# Patient Record
Sex: MALE | Race: AMERICAN INDIAN OR ALASKA NATIVE | NOT HISPANIC OR LATINO | URBAN - METROPOLITAN AREA
[De-identification: names, ages, dates, MRNs, and addresses within clinical notes are randomized per-mention and may not be internally consistent; named-entity substitution may affect disease eponyms.]

---

## 2020-07-25 ENCOUNTER — TELEPHONE ENCOUNTER (OUTPATIENT)
Dept: URBAN - METROPOLITAN AREA CLINIC 13 | Facility: CLINIC | Age: 65
End: 2020-07-25

## 2020-07-26 ENCOUNTER — TELEPHONE ENCOUNTER (OUTPATIENT)
Dept: URBAN - METROPOLITAN AREA CLINIC 13 | Facility: CLINIC | Age: 65
End: 2020-07-26

## 2020-07-26 RX ORDER — AMOXICILLIN 500 MG/1
CAPSULE ORAL
Qty: 21 | Refills: 0 | Status: ACTIVE | COMMUNITY
Start: 2018-05-19

## 2020-07-26 RX ORDER — MULTIVITAMIN
TAKE 1 TABLET DAILY TABLET ORAL
Refills: 0 | Status: ACTIVE | COMMUNITY

## 2020-07-26 RX ORDER — LATANOPROST/PF 0.005 %
INSTILL 1 DROP IN BOTH EYES AT BEDTIME DROPS OPHTHALMIC (EYE)
Refills: 0 | Status: ACTIVE | COMMUNITY
Start: 2018-11-20

## 2020-07-26 RX ORDER — SODIUM SULFATE, POTASSIUM SULFATE, MAGNESIUM SULFATE 17.5; 3.13; 1.6 G/ML; G/ML; G/ML
5PM THE DAY BEFORE PROCEDURE, DRINK 1/2 OF PREP, THEN 6HOURS BEFORE PROCEDURE DRINK REMAINDER OF PREP SOLUTION, CONCENTRATE ORAL
Qty: 1 | Refills: 0 | Status: ACTIVE | COMMUNITY
Start: 2018-11-29

## 2020-07-26 RX ORDER — TESTOSTERONE 10 MG/.5G
APPLY 4 PUMP ACTUATIONS ONE TIME DAILY IN THE MORNING. APPLY TO CLEAN,DRY INTACT SKIN OF THIGH.  DO NOT APPLY TO GENITALS OR OTHER BODY PART GEL, METERED TOPICAL
Refills: 0 | Status: ACTIVE | COMMUNITY
Start: 2018-05-07

## 2020-07-26 RX ORDER — ATORVASTATIN CALCIUM 40 MG/1
TAKE 1 TABLET AT BEDTIME TABLET, FILM COATED ORAL
Refills: 0 | Status: ACTIVE | COMMUNITY
Start: 2018-09-04

## 2020-07-26 RX ORDER — AZELASTINE HYDROCHLORIDE 137 UG/1
INSTILL 2 SPRAYS IN EACH NOSTRIL ONCE DAILY SPRAY, METERED NASAL
Refills: 0 | Status: ACTIVE | COMMUNITY
Start: 2018-05-06

## 2020-07-26 RX ORDER — TRAMADOL HYDROCHLORIDE 50 MG/1
TABLET ORAL
Qty: 15 | Refills: 0 | Status: ACTIVE | COMMUNITY
Start: 2018-05-19

## 2020-07-26 RX ORDER — BUDESONIDE 0.25 MG/2ML
USE 1 UNIT DOSE VIA NEBULIZER DAILY INHALANT ORAL
Refills: 0 | Status: ACTIVE | COMMUNITY

## 2021-08-18 ENCOUNTER — TELEPHONE ENCOUNTER (OUTPATIENT)
Dept: URBAN - METROPOLITAN AREA CLINIC 113 | Facility: CLINIC | Age: 66
End: 2021-08-18

## 2021-08-18 VITALS — WEIGHT: 230 LBS | BODY MASS INDEX: 29.52 KG/M2 | HEIGHT: 74 IN

## 2021-08-18 RX ORDER — TRAMADOL HYDROCHLORIDE 50 MG/1
TABLET ORAL
Qty: 15 | Refills: 0 | Status: ON HOLD | COMMUNITY
Start: 2018-05-19

## 2021-08-18 RX ORDER — AMOXICILLIN 500 MG/1
CAPSULE ORAL
Qty: 21 | Refills: 0 | Status: ON HOLD | COMMUNITY
Start: 2018-05-19

## 2021-08-18 RX ORDER — ATORVASTATIN CALCIUM 40 MG/1
TAKE 1 TABLET AT BEDTIME TABLET, FILM COATED ORAL
Refills: 0 | Status: ACTIVE | COMMUNITY
Start: 2018-09-04

## 2021-08-18 RX ORDER — LATANOPROST/PF 0.005 %
INSTILL 1 DROP IN BOTH EYES AT BEDTIME DROPS OPHTHALMIC (EYE)
Refills: 0 | Status: ACTIVE | COMMUNITY
Start: 2018-11-20

## 2021-08-18 RX ORDER — MULTIVITAMIN
TAKE 1 TABLET DAILY TABLET ORAL
Refills: 0 | Status: ACTIVE | COMMUNITY

## 2021-08-18 RX ORDER — EZETIMIBE 10 MG/1
1 TABLET TABLET ORAL ONCE A DAY
Status: ACTIVE | COMMUNITY

## 2021-08-18 RX ORDER — BUDESONIDE 0.25 MG/2ML
USE 1 UNIT DOSE VIA NEBULIZER DAILY INHALANT ORAL
Refills: 0 | Status: ON HOLD | COMMUNITY

## 2021-08-18 RX ORDER — AZELASTINE HYDROCHLORIDE 137 UG/1
INSTILL 2 SPRAYS IN EACH NOSTRIL ONCE DAILY SPRAY, METERED NASAL
Refills: 0 | Status: ON HOLD | COMMUNITY
Start: 2018-05-06

## 2021-08-18 RX ORDER — SODIUM SULFATE, POTASSIUM SULFATE, MAGNESIUM SULFATE 17.5; 3.13; 1.6 G/ML; G/ML; G/ML
5PM THE DAY BEFORE PROCEDURE, DRINK 1/2 OF PREP, THEN 6HOURS BEFORE PROCEDURE DRINK REMAINDER OF PREP SOLUTION, CONCENTRATE ORAL
Qty: 1 | Refills: 0 | Status: ON HOLD | COMMUNITY
Start: 2018-11-29

## 2021-08-18 RX ORDER — SODIUM, POTASSIUM,MAG SULFATES 17.5-3.13G
354 ML SOLUTION, RECONSTITUTED, ORAL ORAL
Qty: 354 MILLILITER | Refills: 0 | OUTPATIENT
Start: 2021-08-18 | End: 2021-08-19

## 2021-08-18 RX ORDER — TESTOSTERONE 10 MG/.5G
APPLY 4 PUMP ACTUATIONS ONE TIME DAILY IN THE MORNING. APPLY TO CLEAN,DRY INTACT SKIN OF THIGH.  DO NOT APPLY TO GENITALS OR OTHER BODY PART GEL, METERED TOPICAL
Refills: 0 | Status: ACTIVE | COMMUNITY
Start: 2018-05-07

## 2021-08-19 ENCOUNTER — LAB OUTSIDE AN ENCOUNTER (OUTPATIENT)
Dept: URBAN - METROPOLITAN AREA CLINIC 113 | Facility: CLINIC | Age: 66
End: 2021-08-19

## 2021-11-19 PROBLEM — 428283002 HISTORY OF POLYP OF COLON: Status: ACTIVE | Noted: 2021-08-19

## 2021-12-08 ENCOUNTER — TELEPHONE ENCOUNTER (OUTPATIENT)
Dept: URBAN - METROPOLITAN AREA CLINIC 113 | Facility: CLINIC | Age: 66
End: 2021-12-08

## 2021-12-09 ENCOUNTER — TELEPHONE ENCOUNTER (OUTPATIENT)
Dept: URBAN - METROPOLITAN AREA CLINIC 113 | Facility: CLINIC | Age: 66
End: 2021-12-09

## 2021-12-10 ENCOUNTER — OFFICE VISIT (OUTPATIENT)
Dept: URBAN - METROPOLITAN AREA MEDICAL CENTER 40 | Facility: MEDICAL CENTER | Age: 66
End: 2021-12-10
Payer: COMMERCIAL

## 2021-12-10 DIAGNOSIS — K57.30 ACQUIRED DIVERTICULOSIS OF COLON: ICD-10-CM

## 2021-12-10 DIAGNOSIS — Z80.0 FAMILY HISTORY OF ANAL CANCER: ICD-10-CM

## 2021-12-10 DIAGNOSIS — K63.5 BENIGN COLONIC POLYP: ICD-10-CM

## 2021-12-10 DIAGNOSIS — Z12.11 COLON CANCER SCREENING: ICD-10-CM

## 2021-12-10 DIAGNOSIS — Z86.010 ADENOMAS PERSONAL HISTORY OF COLONIC POLYPS: ICD-10-CM

## 2021-12-10 PROCEDURE — 45380 COLONOSCOPY AND BIOPSY: CPT | Performed by: INTERNAL MEDICINE

## 2021-12-28 ENCOUNTER — OFFICE VISIT (OUTPATIENT)
Dept: URBAN - METROPOLITAN AREA CLINIC 72 | Facility: CLINIC | Age: 66
End: 2021-12-28

## 2023-11-21 ENCOUNTER — OFFICE VISIT (OUTPATIENT)
Dept: URBAN - METROPOLITAN AREA CLINIC 72 | Facility: CLINIC | Age: 68
End: 2023-11-21
Payer: COMMERCIAL

## 2023-11-21 ENCOUNTER — DASHBOARD ENCOUNTERS (OUTPATIENT)
Age: 68
End: 2023-11-21

## 2023-11-21 VITALS
BODY MASS INDEX: 29.65 KG/M2 | HEIGHT: 74 IN | WEIGHT: 231 LBS | SYSTOLIC BLOOD PRESSURE: 132 MMHG | TEMPERATURE: 97.1 F | DIASTOLIC BLOOD PRESSURE: 73 MMHG

## 2023-11-21 DIAGNOSIS — Z80.0 FH: COLON CANCER: ICD-10-CM

## 2023-11-21 DIAGNOSIS — K57.90 DIVERTICULOSIS: ICD-10-CM

## 2023-11-21 DIAGNOSIS — R19.5 MUCUS IN STOOL: ICD-10-CM

## 2023-11-21 DIAGNOSIS — Z86.010 HISTORY OF COLON POLYPS: ICD-10-CM

## 2023-11-21 PROCEDURE — 99214 OFFICE O/P EST MOD 30 MIN: CPT | Performed by: INTERNAL MEDICINE

## 2023-11-21 RX ORDER — MULTIVITAMIN
TAKE 1 TABLET DAILY TABLET ORAL
Refills: 0 | Status: ACTIVE | COMMUNITY

## 2023-11-21 RX ORDER — ATORVASTATIN CALCIUM 40 MG/1
TAKE 1 TABLET AT BEDTIME TABLET, FILM COATED ORAL
Refills: 0 | Status: ACTIVE | COMMUNITY
Start: 2018-09-04

## 2023-11-21 RX ORDER — AMOXICILLIN 500 MG/1
CAPSULE ORAL
Qty: 21 | Refills: 0 | Status: ON HOLD | COMMUNITY
Start: 2018-05-19

## 2023-11-21 RX ORDER — EZETIMIBE 10 MG/1
1 TABLET TABLET ORAL ONCE A DAY
Status: ACTIVE | COMMUNITY

## 2023-11-21 RX ORDER — SODIUM SULFATE, POTASSIUM SULFATE, MAGNESIUM SULFATE 17.5; 3.13; 1.6 G/ML; G/ML; G/ML
5PM THE DAY BEFORE PROCEDURE, DRINK 1/2 OF PREP, THEN 6HOURS BEFORE PROCEDURE DRINK REMAINDER OF PREP SOLUTION, CONCENTRATE ORAL
Qty: 1 | Refills: 0 | Status: ON HOLD | COMMUNITY
Start: 2018-11-29

## 2023-11-21 RX ORDER — TESTOSTERONE 10 MG/.5G
APPLY 4 PUMP ACTUATIONS ONE TIME DAILY IN THE MORNING. APPLY TO CLEAN,DRY INTACT SKIN OF THIGH.  DO NOT APPLY TO GENITALS OR OTHER BODY PART GEL, METERED TOPICAL
Refills: 0 | Status: ACTIVE | COMMUNITY
Start: 2018-05-07

## 2023-11-21 RX ORDER — BUDESONIDE 0.25 MG/2ML
USE 1 UNIT DOSE VIA NEBULIZER DAILY INHALANT ORAL
Refills: 0 | Status: ON HOLD | COMMUNITY

## 2023-11-21 RX ORDER — AZELASTINE HYDROCHLORIDE 137 UG/1
INSTILL 2 SPRAYS IN EACH NOSTRIL ONCE DAILY SPRAY, METERED NASAL
Refills: 0 | Status: ON HOLD | COMMUNITY
Start: 2018-05-06

## 2023-11-21 RX ORDER — TRAMADOL HYDROCHLORIDE 50 MG/1
TABLET ORAL
Qty: 15 | Refills: 0 | Status: ON HOLD | COMMUNITY
Start: 2018-05-19

## 2023-11-21 RX ORDER — LATANOPROST/PF 0.005 %
INSTILL 1 DROP IN BOTH EYES AT BEDTIME DROPS OPHTHALMIC (EYE)
Refills: 0 | Status: ACTIVE | COMMUNITY
Start: 2018-11-20

## 2023-11-21 NOTE — EXAM-PHYSICAL EXAM
General--no acute distress, resting comfortably Eyes--anicteric, no pallor HENT--normocephalic, atraumatic head Neck--no lymphadenopathy, non tender Chest--non labored breathing, equal rise Abdomen--soft, non tender, non distended, no organomegaly Ext: BRANDON, no obvious sores or rashes Psych: appropriate mood and affect Neuro--alert and oriented, answers appropriately

## 2023-11-21 NOTE — HPI-TODAY'S VISIT:
Mr. Huertas is a pleasant 68-year-old male who returns for follow-up.  He has previously been seen at the [facility]. He presents today with a complaint of change in bowel habits with mucus.  He was referred back to us by Dr. Alysha Chandler, a copy of this consultation will be forwarded to the referring physician. He has a history of hyperlipidemia, pituitary neoplasm, colon polyp, family history of colon cancer, diverticulosis.  He reports that he has noticed some stools and pellet-like stools and occasional mucus.  He denies any blood per rectum.  He did have 1 bout of right lower quadrant pain that preceded 1 of these firmer stools.  He describes it as Tow stool scale 1 or 2.  He tries to eat salads but is not on a fiber product he does drink water frequently unsure if he is getting 64 ounces daily.   Colonoscopy 12/10/2021 completed through the [facility]ec[facility] was significant for diverticulosis and a 3 mm transverse colon polyp which was hyperplastic. 12/5/2018 colonoscopy completed through [facility] with excellent bowel preparation had a cecal polyp and 4 ascending colon polyps all of which returned as adenomatous.

## 2024-03-12 ENCOUNTER — OV EP (OUTPATIENT)
Dept: URBAN - METROPOLITAN AREA CLINIC 72 | Facility: CLINIC | Age: 69
End: 2024-03-12

## 2024-10-15 PROBLEM — 236069009: Status: ACTIVE | Noted: 2024-10-15

## 2024-10-15 PROBLEM — 428283002: Status: ACTIVE | Noted: 2024-10-15

## 2024-10-16 ENCOUNTER — OFFICE VISIT (OUTPATIENT)
Dept: URBAN - METROPOLITAN AREA CLINIC 72 | Facility: CLINIC | Age: 69
End: 2024-10-16
Payer: COMMERCIAL

## 2024-10-16 VITALS
HEIGHT: 74 IN | HEART RATE: 64 BPM | BODY MASS INDEX: 30.08 KG/M2 | OXYGEN SATURATION: 97 % | WEIGHT: 234.4 LBS | SYSTOLIC BLOOD PRESSURE: 140 MMHG | TEMPERATURE: 97.5 F | DIASTOLIC BLOOD PRESSURE: 80 MMHG

## 2024-10-16 DIAGNOSIS — Z86.0100 PERSONAL HISTORY OF COLONIC POLYPS: ICD-10-CM

## 2024-10-16 DIAGNOSIS — K59.09 CHRONIC CONSTIPATION: ICD-10-CM

## 2024-10-16 DIAGNOSIS — K57.30 ACQUIRED DIVERTICULOSIS OF COLON: ICD-10-CM

## 2024-10-16 PROCEDURE — 99214 OFFICE O/P EST MOD 30 MIN: CPT

## 2024-10-16 RX ORDER — TESTOSTERONE 10 MG/.5G
APPLY 4 PUMP ACTUATIONS ONE TIME DAILY IN THE MORNING. APPLY TO CLEAN,DRY INTACT SKIN OF THIGH.  DO NOT APPLY TO GENITALS OR OTHER BODY PART GEL, METERED TOPICAL
Refills: 0 | Status: ACTIVE | COMMUNITY
Start: 2018-05-07

## 2024-10-16 RX ORDER — AMOXICILLIN 500 MG/1
CAPSULE ORAL
Qty: 21 | Refills: 0 | Status: ON HOLD | COMMUNITY
Start: 2018-05-19

## 2024-10-16 RX ORDER — TRAMADOL HYDROCHLORIDE 50 MG/1
TABLET, FILM COATED ORAL
Qty: 15 | Refills: 0 | Status: ON HOLD | COMMUNITY
Start: 2018-05-19

## 2024-10-16 RX ORDER — SODIUM SULFATE, POTASSIUM SULFATE, MAGNESIUM SULFATE 17.5; 3.13; 1.6 G/ML; G/ML; G/ML
5PM THE DAY BEFORE PROCEDURE, DRINK 1/2 OF PREP, THEN 6HOURS BEFORE PROCEDURE DRINK REMAINDER OF PREP SOLUTION, CONCENTRATE ORAL
Qty: 1 | Refills: 0 | Status: ON HOLD | COMMUNITY
Start: 2018-11-29

## 2024-10-16 RX ORDER — MULTIVITAMIN
TAKE 1 TABLET DAILY TABLET ORAL
Refills: 0 | Status: ACTIVE | COMMUNITY

## 2024-10-16 RX ORDER — AZELASTINE HYDROCHLORIDE 137 UG/1
INSTILL 2 SPRAYS IN EACH NOSTRIL ONCE DAILY SPRAY, METERED NASAL
Refills: 0 | Status: ON HOLD | COMMUNITY
Start: 2018-05-06

## 2024-10-16 RX ORDER — MELOXICAM 15 MG/1
1 TABLET TABLET ORAL ONCE A DAY
Status: ACTIVE | COMMUNITY

## 2024-10-16 RX ORDER — EZETIMIBE 10 MG/1
1 TABLET TABLET ORAL ONCE A DAY
Status: ACTIVE | COMMUNITY

## 2024-10-16 RX ORDER — BRIMONIDINE TARTRATE 2 MG/ML
1 DROP INTO AFFECTED EYE SOLUTION/ DROPS OPHTHALMIC
Status: ACTIVE | COMMUNITY

## 2024-10-16 RX ORDER — LATANOPROST/PF 0.005 %
INSTILL 1 DROP IN BOTH EYES AT BEDTIME DROPS OPHTHALMIC (EYE)
Refills: 0 | Status: ACTIVE | COMMUNITY
Start: 2018-11-20

## 2024-10-16 RX ORDER — ATORVASTATIN CALCIUM 40 MG/1
TAKE 1 TABLET AT BEDTIME TABLET, FILM COATED ORAL
Refills: 0 | Status: ACTIVE | COMMUNITY
Start: 2018-09-04

## 2024-10-16 RX ORDER — BUDESONIDE 0.25 MG/2ML
USE 1 UNIT DOSE VIA NEBULIZER DAILY INHALANT ORAL
Refills: 0 | Status: ON HOLD | COMMUNITY

## 2024-10-16 RX ORDER — TIMOLOL MALEATE 5 MG/ML
1 DROP INTO AFFECTED EYE SOLUTION/ DROPS OPHTHALMIC ONCE A DAY
Status: ACTIVE | COMMUNITY

## 2024-10-16 RX ORDER — ROSUVASTATIN CALCIUM 20 MG/1
1 TABLET TABLET, COATED ORAL ONCE A DAY
Status: ACTIVE | COMMUNITY

## 2024-10-16 NOTE — HPI-TODAY'S VISIT:
Patient is a 69-year-old male here for colonoscopy screening.  Tried to schedule via [facility], but was denied.  Patient was last seen in the office on 2023 for constipation, diverticulosis, history of colon polyps, and his family history of colon cancer.  Patient was asked to increase water consumption and start fiber daily.  Patient is not due for a colonoscopy until .  Patient has dievrticulosis that his stools are getting a little more constipated. He is eating healthier over the last year and a half.   Father  of colon cancer. Diagnosed in his 60's.

## 2024-10-16 NOTE — HPI-OTHER HISTORIES
Procedures: Colonoscopy 12/10/2021: completed through the [facility] was significant for diverticulosis and a 3 mm transverse colon polyp which was hyperplastic.  5-year recall.  Due 12/2026.  Colonoscopy 12/5/2018 :completed through [facility] with excellent bowel preparation had a cecal polyp and 4 ascending colon polyps all of which returned as adenomatous.  3-year recall.  Due 12/2021.